# Patient Record
Sex: MALE | Race: WHITE | NOT HISPANIC OR LATINO | Employment: OTHER | URBAN - METROPOLITAN AREA
[De-identification: names, ages, dates, MRNs, and addresses within clinical notes are randomized per-mention and may not be internally consistent; named-entity substitution may affect disease eponyms.]

---

## 2017-09-05 NOTE — PATIENT DISCUSSION
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 Stable - Plan : Monitor. Advised patient to call our office with decreased vision or increased symptoms.  Rtc 1 year Exam/Octm

## 2018-11-21 NOTE — PATIENT DISCUSSION
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 Advised patient to call our office with decreased vision or increased symptoms. 1 YEAR EXAM/. FUNDUS PHOTOS

## 2019-08-08 ENCOUNTER — NEW PATIENT COMPREHENSIVE (OUTPATIENT)
Dept: URBAN - METROPOLITAN AREA CLINIC 36 | Facility: CLINIC | Age: 84
End: 2019-08-08

## 2019-08-08 DIAGNOSIS — H04.123: ICD-10-CM

## 2019-08-08 DIAGNOSIS — Z96.1: ICD-10-CM

## 2019-08-08 DIAGNOSIS — E11.9: ICD-10-CM

## 2019-08-08 DIAGNOSIS — H52.7: ICD-10-CM

## 2019-08-08 PROCEDURE — 92015 DETERMINE REFRACTIVE STATE: CPT

## 2019-08-08 PROCEDURE — 92004 COMPRE OPH EXAM NEW PT 1/>: CPT

## 2019-08-08 ASSESSMENT — VISUAL ACUITY
OS_CC: 20/25+2
OS_SC: J3
OS_SC: 20/40
OD_CC: 20/30
OD_SC: 20/50+2
OD_SC: J6
OS_CC: J1
OD_CC: J1

## 2019-08-08 ASSESSMENT — TONOMETRY
OD_IOP_MMHG: 15
OS_IOP_MMHG: 16

## 2020-11-19 NOTE — PATIENT DISCUSSION
C/o flashes OS>OD. Discussed flashes and floaters at length. Discussed risks of Retinal tear or detachament.